# Patient Record
Sex: MALE | Race: WHITE | NOT HISPANIC OR LATINO
[De-identification: names, ages, dates, MRNs, and addresses within clinical notes are randomized per-mention and may not be internally consistent; named-entity substitution may affect disease eponyms.]

---

## 2017-12-01 ENCOUNTER — APPOINTMENT (OUTPATIENT)
Dept: ORTHOPEDIC SURGERY | Facility: CLINIC | Age: 67
End: 2017-12-01
Payer: MEDICARE

## 2017-12-01 VITALS — WEIGHT: 210 LBS | BODY MASS INDEX: 31.1 KG/M2 | HEIGHT: 69 IN

## 2017-12-01 VITALS — DIASTOLIC BLOOD PRESSURE: 72 MMHG | HEART RATE: 69 BPM | SYSTOLIC BLOOD PRESSURE: 128 MMHG

## 2017-12-01 DIAGNOSIS — Z87.39 PERSONAL HISTORY OF OTHER DISEASES OF THE MUSCULOSKELETAL SYSTEM AND CONNECTIVE TISSUE: ICD-10-CM

## 2017-12-01 DIAGNOSIS — Z80.3 FAMILY HISTORY OF MALIGNANT NEOPLASM OF BREAST: ICD-10-CM

## 2017-12-01 DIAGNOSIS — Z85.46 PERSONAL HISTORY OF MALIGNANT NEOPLASM OF PROSTATE: ICD-10-CM

## 2017-12-01 DIAGNOSIS — Z78.9 OTHER SPECIFIED HEALTH STATUS: ICD-10-CM

## 2017-12-01 DIAGNOSIS — M51.36 OTHER INTERVERTEBRAL DISC DEGENERATION, LUMBAR REGION: ICD-10-CM

## 2017-12-01 DIAGNOSIS — Z87.09 PERSONAL HISTORY OF OTHER DISEASES OF THE RESPIRATORY SYSTEM: ICD-10-CM

## 2017-12-01 PROCEDURE — 72100 X-RAY EXAM L-S SPINE 2/3 VWS: CPT

## 2017-12-01 PROCEDURE — 99204 OFFICE O/P NEW MOD 45 MIN: CPT

## 2017-12-01 RX ORDER — SERTRALINE HYDROCHLORIDE 100 MG/1
100 TABLET, FILM COATED ORAL
Qty: 90 | Refills: 0 | Status: ACTIVE | COMMUNITY
Start: 2016-12-23

## 2017-12-05 ENCOUNTER — FORM ENCOUNTER (OUTPATIENT)
Age: 67
End: 2017-12-05

## 2017-12-06 ENCOUNTER — APPOINTMENT (OUTPATIENT)
Dept: MRI IMAGING | Facility: CLINIC | Age: 67
End: 2017-12-06
Payer: MEDICARE

## 2017-12-06 ENCOUNTER — OUTPATIENT (OUTPATIENT)
Dept: OUTPATIENT SERVICES | Facility: HOSPITAL | Age: 67
LOS: 1 days | End: 2017-12-06
Payer: MEDICARE

## 2017-12-06 DIAGNOSIS — Z00.8 ENCOUNTER FOR OTHER GENERAL EXAMINATION: ICD-10-CM

## 2017-12-06 PROCEDURE — 72148 MRI LUMBAR SPINE W/O DYE: CPT | Mod: 26

## 2017-12-06 PROCEDURE — 72148 MRI LUMBAR SPINE W/O DYE: CPT

## 2017-12-08 ENCOUNTER — TRANSCRIPTION ENCOUNTER (OUTPATIENT)
Age: 67
End: 2017-12-08

## 2018-01-18 ENCOUNTER — APPOINTMENT (OUTPATIENT)
Dept: ORTHOPEDIC SURGERY | Facility: CLINIC | Age: 68
End: 2018-01-18
Payer: MEDICARE

## 2018-01-18 DIAGNOSIS — M48.07 SPINAL STENOSIS, LUMBOSACRAL REGION: ICD-10-CM

## 2018-01-18 PROCEDURE — 99214 OFFICE O/P EST MOD 30 MIN: CPT

## 2018-11-12 ENCOUNTER — APPOINTMENT (OUTPATIENT)
Dept: ORTHOPEDIC SURGERY | Facility: CLINIC | Age: 68
End: 2018-11-12
Payer: MEDICARE

## 2018-11-12 VITALS
WEIGHT: 185 LBS | HEART RATE: 74 BPM | DIASTOLIC BLOOD PRESSURE: 88 MMHG | BODY MASS INDEX: 27.4 KG/M2 | HEIGHT: 69 IN | SYSTOLIC BLOOD PRESSURE: 131 MMHG

## 2018-11-12 DIAGNOSIS — Z80.3 FAMILY HISTORY OF MALIGNANT NEOPLASM OF BREAST: ICD-10-CM

## 2018-11-12 DIAGNOSIS — M18.11 UNILATERAL PRIMARY OSTEOARTHRITIS OF FIRST CARPOMETACARPAL JOINT, RIGHT HAND: ICD-10-CM

## 2018-11-12 DIAGNOSIS — M19.031 PRIMARY OSTEOARTHRITIS, RIGHT WRIST: ICD-10-CM

## 2018-11-12 PROCEDURE — 73130 X-RAY EXAM OF HAND: CPT | Mod: RT

## 2018-11-12 PROCEDURE — 99214 OFFICE O/P EST MOD 30 MIN: CPT

## 2018-11-12 RX ORDER — OXYCODONE 5 MG/1
5 TABLET ORAL
Qty: 60 | Refills: 0 | Status: ACTIVE | COMMUNITY
Start: 2018-06-15

## 2018-11-12 RX ORDER — RIVAROXABAN 10 MG/1
10 TABLET, FILM COATED ORAL
Qty: 30 | Refills: 0 | Status: ACTIVE | COMMUNITY
Start: 2018-06-15

## 2018-11-12 RX ORDER — MELOXICAM 7.5 MG/1
7.5 TABLET ORAL
Qty: 30 | Refills: 0 | Status: ACTIVE | COMMUNITY
Start: 2018-06-15

## 2018-11-12 RX ORDER — PANTOPRAZOLE 40 MG/1
40 TABLET, DELAYED RELEASE ORAL
Qty: 30 | Refills: 0 | Status: ACTIVE | COMMUNITY
Start: 2018-06-15

## 2019-03-10 ENCOUNTER — TRANSCRIPTION ENCOUNTER (OUTPATIENT)
Age: 69
End: 2019-03-10

## 2020-08-20 ENCOUNTER — APPOINTMENT (OUTPATIENT)
Dept: ORTHOPEDIC SURGERY | Facility: CLINIC | Age: 70
End: 2020-08-20
Payer: MEDICARE

## 2020-08-20 DIAGNOSIS — M18.11 UNILATERAL PRIMARY OSTEOARTHRITIS OF FIRST CARPOMETACARPAL JOINT, RIGHT HAND: ICD-10-CM

## 2020-08-20 DIAGNOSIS — M19.041 PRIMARY OSTEOARTHRITIS, RIGHT HAND: ICD-10-CM

## 2020-08-20 PROCEDURE — 73130 X-RAY EXAM OF HAND: CPT | Mod: RT

## 2020-08-20 PROCEDURE — 99213 OFFICE O/P EST LOW 20 MIN: CPT

## 2020-08-20 NOTE — HISTORY OF PRESENT ILLNESS
[Right] : right hand dominant [FreeTextEntry1] : 69 yo M retired  presents c/o right hand pain for several months.  Pain started in base of thumb, now progressing into entire hand.  Worsening over the past 2 weeks.  He has weakness in his thumb and hand throughout the day, worse with gripping items. He denies any history of injury and any numbness. He has been applying CBD oil with relief.  He states he is active, swims and exercises daily, but pushups increase the discomfort. He has not done any therapy, or injections.  Denies numbness/tingling or thumb triggering. He had bilateral TKR at [facility] by Dr Jann Griffith.\par \par He returns today stating that he has a cyst on his right hand that grows in size some days. Patient does have osteoarthritis in his right hand. He states that he has pain from time to time.

## 2020-08-20 NOTE — ADDENDUM
[FreeTextEntry1] : I, Claribel Fink wrote this note acting as a scribe for Dr. Tae Albrecht on Aug 20, 2020.\par

## 2020-08-20 NOTE — DISCUSSION/SUMMARY
[FreeTextEntry1] : The underlying pathophysiology was reviewed with the patient. Treatment options were discussed including; observation, NSAIDS, analgesics, injection, bracing, and physical therapy. \par \par Aspiration of a cyst in the right thumb was discussed.\par Patient was advised to continue with observation of his symptoms.\par Soak hand in warm water and Epsom salt. \par Avoid using hand grippers.\par NSAIDs as tolerated. \par Follow Up as needed.\par

## 2020-08-20 NOTE — END OF VISIT
[FreeTextEntry3] : I, Tae Albrecht MD, ordering physician, have read and attest that all the information, medical decision making and discharge instructions within are true and accurate.\par

## 2020-08-20 NOTE — PHYSICAL EXAM
[de-identified] : Patient is well-nourished, well developed, alert and in no acute distress. Breathing is unlabored. He is grossly oriented to person, place and time.\par \par Right Hand: There is tenderness to palpation in the base of the thumb and A1 pulley tenderness of the thumb, full arc of motion in the fingers, all intrinsic and extrinsic hand muscles 5/5, no joint instability on provocative testing, sensation intact to light touch, no skin lesions or discoloration. \par \par \par \par   [de-identified] : Right Hand: AP, lateral and oblique views of the hand were obtained today and revealed right radial scaphoid osteoarthritis and right thumb CMC arthritis. \par \par \par

## 2021-02-03 ENCOUNTER — TRANSCRIPTION ENCOUNTER (OUTPATIENT)
Age: 71
End: 2021-02-03

## 2023-01-12 ENCOUNTER — OFFICE (OUTPATIENT)
Dept: URBAN - METROPOLITAN AREA CLINIC 90 | Facility: CLINIC | Age: 73
Setting detail: OPHTHALMOLOGY
End: 2023-01-12
Payer: MEDICARE

## 2023-01-12 DIAGNOSIS — H57.02: ICD-10-CM

## 2023-01-12 DIAGNOSIS — H16.223: ICD-10-CM

## 2023-01-12 DIAGNOSIS — H26.493: ICD-10-CM

## 2023-01-12 DIAGNOSIS — H35.432: ICD-10-CM

## 2023-01-12 DIAGNOSIS — H43.391: ICD-10-CM

## 2023-01-12 DIAGNOSIS — H35.373: ICD-10-CM

## 2023-01-12 PROCEDURE — 92014 COMPRE OPH EXAM EST PT 1/>: CPT | Performed by: OPHTHALMOLOGY

## 2023-01-12 PROCEDURE — 92250 FUNDUS PHOTOGRAPHY W/I&R: CPT | Performed by: OPHTHALMOLOGY

## 2023-01-12 ASSESSMENT — AXIALLENGTH_DERIVED
OD_AL: 24.3088
OD_AL: 24.1547
OS_AL: 23.8636
OS_AL: 23.9135
OD_AL: 24.2058
OD_AL: 24.2572
OD_AL: 24.2058
OS_AL: 24.2176
OS_AL: 23.5683
OS_AL: 23.9135

## 2023-01-12 ASSESSMENT — REFRACTION_CURRENTRX
OS_AXIS: 085
OD_CYLINDER: -1.50
OS_OVR_VA: 20/
OD_VPRISM_DIRECTION: SV
OS_VPRISM_DIRECTION: SV
OD_AXIS: 099
OD_SPHERE: -1.25
OS_SPHERE: -1.75
OS_CYLINDER: -1.75
OD_OVR_VA: 20/

## 2023-01-12 ASSESSMENT — TEAR BREAK UP TIME (TBUT)
OS_TBUT: T
OD_TBUT: T

## 2023-01-12 ASSESSMENT — REFRACTION_MANIFEST
OS_AXIS: 083
OD_AXIS: 111
OD_SPHERE: -1.25
OD_AXIS: 115
OS_VA1: 20/20
OS_SPHERE: -1.75
OS_AXIS: 090
OS_AXIS: 091
OS_SPHERE: -1.00
OD_CYLINDER: -2.25
OD_SPHERE: -1.00
OD_AXIS: 109
OD_CYLINDER: -1.75
OS_CYLINDER: -1.75
OD_VA1: 20/30+1
OD_SPHERE: -1.50
OU_VA: 20/25+2
OS_AXIS: 080
OD_CYLINDER: -1.50
OS_CYLINDER: -1.50
OD_AXIS: 100
OD_VA1: 20/20
OS_CYLINDER: -1.75
OS_CYLINDER: -1.75
OD_CYLINDER: -2.00
OD_SPHERE: -1.25
OS_VA1: 20/30+2
OS_SPHERE: -1.00
OD_VA1: 20/20-2
OD_VA1: 20/25-
OS_SPHERE: -1.00
OS_VA1: 20/20-2
OS_VA1: 20/25

## 2023-01-12 ASSESSMENT — VISUAL ACUITY
OD_BCVA: 20/25+1
OS_BCVA: 20/20

## 2023-01-12 ASSESSMENT — REFRACTION_AUTOREFRACTION
OD_AXIS: 102
OS_CYLINDER: -2.00
OD_SPHERE: -1.25
OS_AXIS: 085
OS_SPHERE: 0.00
OD_CYLINDER: -1.75

## 2023-01-12 ASSESSMENT — KERATOMETRY
OD_AXISANGLE_DEGREES: 027
OS_AXISANGLE_DEGREES: 173
METHOD_AUTO_MANUAL: AUTO
OS_K2POWER_DIOPTERS: 45.25
OD_K2POWER_DIOPTERS: 44.75
OD_K1POWER_DIOPTERS: 43.50
OS_K1POWER_DIOPTERS: 44.00

## 2023-01-12 ASSESSMENT — SPHEQUIV_DERIVED
OS_SPHEQUIV: -1.75
OD_SPHEQUIV: -2.125
OD_SPHEQUIV: -2.25
OD_SPHEQUIV: -2.125
OS_SPHEQUIV: -1
OD_SPHEQUIV: -2
OS_SPHEQUIV: -2.625
OS_SPHEQUIV: -1.875
OS_SPHEQUIV: -1.875
OD_SPHEQUIV: -2.375

## 2023-01-12 ASSESSMENT — CONFRONTATIONAL VISUAL FIELD TEST (CVF)
OS_FINDINGS: FULL
OD_FINDINGS: FULL

## 2024-01-04 ENCOUNTER — NON-APPOINTMENT (OUTPATIENT)
Age: 74
End: 2024-01-04

## 2024-01-12 ENCOUNTER — OFFICE (OUTPATIENT)
Dept: URBAN - METROPOLITAN AREA CLINIC 90 | Facility: CLINIC | Age: 74
Setting detail: OPHTHALMOLOGY
End: 2024-01-12
Payer: MEDICARE

## 2024-01-12 DIAGNOSIS — H35.373: ICD-10-CM

## 2024-01-12 DIAGNOSIS — H43.391: ICD-10-CM

## 2024-01-12 DIAGNOSIS — H26.493: ICD-10-CM

## 2024-01-12 DIAGNOSIS — H35.432: ICD-10-CM

## 2024-01-12 DIAGNOSIS — H16.223: ICD-10-CM

## 2024-01-12 DIAGNOSIS — H57.02: ICD-10-CM

## 2024-01-12 PROCEDURE — 92014 COMPRE OPH EXAM EST PT 1/>: CPT | Performed by: OPHTHALMOLOGY

## 2024-01-12 PROCEDURE — 92250 FUNDUS PHOTOGRAPHY W/I&R: CPT | Performed by: OPHTHALMOLOGY

## 2024-01-12 ASSESSMENT — CONFRONTATIONAL VISUAL FIELD TEST (CVF)
OS_FINDINGS: FULL
OD_FINDINGS: FULL

## 2024-01-12 ASSESSMENT — REFRACTION_MANIFEST
OS_VA1: 20/20
OD_SPHERE: -1.00
OD_AXIS: 109
OS_SPHERE: -1.00
OS_CYLINDER: -1.50
OS_AXIS: 080
OS_AXIS: 083
OD_CYLINDER: -1.75
OD_AXIS: 111
OS_VA1: 20/20-2
OS_SPHERE: -1.75
OU_VA: 20/25+2
OD_AXIS: 115
OD_VA1: 20/20
OS_AXIS: 091
OS_VA1: 20/30+2
OS_CYLINDER: -1.75
OD_SPHERE: -1.25
OD_CYLINDER: -2.25
OS_CYLINDER: -1.75
OD_SPHERE: -1.25
OS_SPHERE: -1.00
OD_VA1: 20/30+1
OD_AXIS: 100
OS_SPHERE: -1.00
OD_SPHERE: -1.50
OD_VA1: 20/20-2
OD_CYLINDER: -1.50
OS_AXIS: 090
OS_CYLINDER: -1.75
OD_VA1: 20/25-
OD_CYLINDER: -2.00
OS_VA1: 20/25

## 2024-01-12 ASSESSMENT — REFRACTION_CURRENTRX
OS_AXIS: 085
OS_CYLINDER: -1.75
OD_CYLINDER: -1.50
OS_OVR_VA: 20/
OD_VPRISM_DIRECTION: SV
OD_SPHERE: -1.25
OS_SPHERE: -1.75
OD_OVR_VA: 20/
OD_AXIS: 099
OS_VPRISM_DIRECTION: SV
OS_AXIS: 087
OS_CYLINDER: -1.75
OS_SPHERE: -1.75
OS_VPRISM_DIRECTION: SV
OD_CYLINDER: -1.50
OD_AXIS: 101
OS_OVR_VA: 20/
OD_VPRISM_DIRECTION: SV
OD_SPHERE: -1.25
OD_OVR_VA: 20/

## 2024-01-12 ASSESSMENT — REFRACTION_AUTOREFRACTION
OS_SPHERE: -1.00
OS_CYLINDER: -2.25
OS_AXIS: 090
OD_AXIS: 107
OD_CYLINDER: -1.75
OD_SPHERE: -1.00

## 2024-01-12 ASSESSMENT — SPHEQUIV_DERIVED
OD_SPHEQUIV: -1.875
OS_SPHEQUIV: -1.75
OS_SPHEQUIV: -2.125
OS_SPHEQUIV: -1.875
OD_SPHEQUIV: -2.375
OD_SPHEQUIV: -2.25
OS_SPHEQUIV: -1.875
OD_SPHEQUIV: -2
OD_SPHEQUIV: -2.125
OS_SPHEQUIV: -2.625

## 2024-01-12 ASSESSMENT — TEAR BREAK UP TIME (TBUT)
OD_TBUT: T
OS_TBUT: T

## 2024-01-16 DIAGNOSIS — M54.2 CERVICALGIA: ICD-10-CM

## 2024-01-17 ENCOUNTER — APPOINTMENT (OUTPATIENT)
Dept: ORTHOPEDIC SURGERY | Facility: CLINIC | Age: 74
End: 2024-01-17
Payer: MEDICARE

## 2024-01-17 VITALS — BODY MASS INDEX: 27.4 KG/M2 | WEIGHT: 185 LBS | HEIGHT: 69 IN

## 2024-01-17 DIAGNOSIS — M47.812 SPONDYLOSIS W/OUT MYELOPATHY OR RADICULOPATHY, CERVICAL REGION: ICD-10-CM

## 2024-01-17 DIAGNOSIS — M50.30 OTHER CERVICAL DISC DEGENERATION, UNSPECIFIED CERVICAL REGION: ICD-10-CM

## 2024-01-17 PROCEDURE — 72040 X-RAY EXAM NECK SPINE 2-3 VW: CPT

## 2024-01-17 PROCEDURE — 99205 OFFICE O/P NEW HI 60 MIN: CPT

## 2024-01-17 PROCEDURE — 72100 X-RAY EXAM L-S SPINE 2/3 VWS: CPT

## 2025-01-07 ENCOUNTER — OFFICE (OUTPATIENT)
Facility: LOCATION | Age: 75
Setting detail: OPHTHALMOLOGY
End: 2025-01-07
Payer: MEDICARE

## 2025-01-07 DIAGNOSIS — H01.001: ICD-10-CM

## 2025-01-07 DIAGNOSIS — H01.004: ICD-10-CM

## 2025-01-07 DIAGNOSIS — H26.493: ICD-10-CM

## 2025-01-07 DIAGNOSIS — H35.373: ICD-10-CM

## 2025-01-07 DIAGNOSIS — H35.432: ICD-10-CM

## 2025-01-07 DIAGNOSIS — H43.391: ICD-10-CM

## 2025-01-07 DIAGNOSIS — H43.813: ICD-10-CM

## 2025-01-07 DIAGNOSIS — H16.223: ICD-10-CM

## 2025-01-07 DIAGNOSIS — H57.02: ICD-10-CM

## 2025-01-07 PROCEDURE — 92134 CPTRZ OPH DX IMG PST SGM RTA: CPT | Performed by: OPHTHALMOLOGY

## 2025-01-07 PROCEDURE — 92014 COMPRE OPH EXAM EST PT 1/>: CPT | Performed by: OPHTHALMOLOGY

## 2025-01-07 ASSESSMENT — REFRACTION_MANIFEST
OS_CYLINDER: -1.50
OS_CYLINDER: -1.75
OD_VA1: 20/30+1
OD_AXIS: 100
OS_SPHERE: -1.75
OD_SPHERE: -1.50
OS_AXIS: 090
OS_VA1: 20/20-2
OD_CYLINDER: -1.50
OD_SPHERE: -1.00
OD_VA1: 20/25-
OD_CYLINDER: -1.75
OD_AXIS: 109
OS_SPHERE: -1.00
OU_VA: 20/25+2
OS_AXIS: 080
OD_AXIS: 115
OS_CYLINDER: -1.75
OD_VA1: 20/20-2
OS_AXIS: 091
OD_CYLINDER: -2.00
OD_SPHERE: -1.25
OD_AXIS: 111
OD_CYLINDER: -2.25
OS_AXIS: 083
OD_VA1: 20/20
OS_CYLINDER: -1.75
OD_SPHERE: -1.25
OS_VA1: 20/25
OS_SPHERE: -1.00
OS_SPHERE: -1.00
OS_VA1: 20/30+2
OS_VA1: 20/20

## 2025-01-07 ASSESSMENT — KERATOMETRY
OD_K1POWER_DIOPTERS: 43.50
METHOD_AUTO_MANUAL: AUTO
OS_AXISANGLE_DEGREES: 178
OD_AXISANGLE_DEGREES: 025
OD_K2POWER_DIOPTERS: 44.75
OS_K1POWER_DIOPTERS: 44.00
OS_K2POWER_DIOPTERS: 45.25

## 2025-01-07 ASSESSMENT — REFRACTION_CURRENTRX
OS_VPRISM_DIRECTION: SV
OD_OVR_VA: 20/
OS_AXIS: 087
OD_AXIS: 099
OD_SPHERE: -1.25
OS_OVR_VA: 20/
OS_SPHERE: -1.75
OS_SPHERE: -1.75
OD_CYLINDER: -1.50
OS_CYLINDER: -1.75
OD_AXIS: 101
OD_VPRISM_DIRECTION: SV
OS_VPRISM_DIRECTION: SV
OD_CYLINDER: -1.50
OS_OVR_VA: 20/
OS_AXIS: 085
OD_SPHERE: -1.25
OD_OVR_VA: 20/
OS_CYLINDER: -1.75
OD_VPRISM_DIRECTION: SV

## 2025-01-07 ASSESSMENT — REFRACTION_AUTOREFRACTION
OS_AXIS: 087
OD_SPHERE: -1.00
OD_CYLINDER: -2.00
OS_CYLINDER: -2.00
OS_SPHERE: -1.25
OD_AXIS: 104

## 2025-01-07 ASSESSMENT — TEAR BREAK UP TIME (TBUT)
OS_TBUT: T
OD_TBUT: T

## 2025-01-07 ASSESSMENT — TONOMETRY
OD_IOP_MMHG: 10
OS_IOP_MMHG: 11

## 2025-01-07 ASSESSMENT — CONFRONTATIONAL VISUAL FIELD TEST (CVF)
OS_FINDINGS: FULL
OD_FINDINGS: FULL

## 2025-01-07 ASSESSMENT — VISUAL ACUITY
OS_BCVA: 20/15-2
OD_BCVA: 20/20+

## 2025-01-07 ASSESSMENT — LID EXAM ASSESSMENTS
OS_BLEPHARITIS: LUL 1+
OD_BLEPHARITIS: RUL 1+